# Patient Record
Sex: FEMALE | Race: ASIAN | NOT HISPANIC OR LATINO | Employment: UNEMPLOYED | URBAN - METROPOLITAN AREA
[De-identification: names, ages, dates, MRNs, and addresses within clinical notes are randomized per-mention and may not be internally consistent; named-entity substitution may affect disease eponyms.]

---

## 2022-07-26 ENCOUNTER — OFFICE VISIT (OUTPATIENT)
Dept: PRIMARY CARE CLINIC | Facility: CLINIC | Age: 22
End: 2022-07-26
Payer: COMMERCIAL

## 2022-07-26 VITALS
OXYGEN SATURATION: 98 % | HEART RATE: 89 BPM | TEMPERATURE: 99 F | BODY MASS INDEX: 17.77 KG/M2 | RESPIRATION RATE: 16 BRPM | SYSTOLIC BLOOD PRESSURE: 90 MMHG | WEIGHT: 96.56 LBS | HEIGHT: 62 IN | DIASTOLIC BLOOD PRESSURE: 56 MMHG

## 2022-07-26 DIAGNOSIS — Z13.6 SCREENING FOR CARDIOVASCULAR CONDITION: ICD-10-CM

## 2022-07-26 DIAGNOSIS — L21.9 SEBORRHEIC DERMATITIS OF SCALP: ICD-10-CM

## 2022-07-26 DIAGNOSIS — Z11.4 ENCOUNTER FOR SCREENING FOR HIV: ICD-10-CM

## 2022-07-26 DIAGNOSIS — Z76.89 ENCOUNTER TO ESTABLISH CARE: Primary | ICD-10-CM

## 2022-07-26 DIAGNOSIS — Z11.59 NEED FOR HEPATITIS C SCREENING TEST: ICD-10-CM

## 2022-07-26 DIAGNOSIS — F41.9 ANXIETY: ICD-10-CM

## 2022-07-26 PROCEDURE — 1159F PR MEDICATION LIST DOCUMENTED IN MEDICAL RECORD: ICD-10-PCS | Mod: CPTII,S$GLB,, | Performed by: STUDENT IN AN ORGANIZED HEALTH CARE EDUCATION/TRAINING PROGRAM

## 2022-07-26 PROCEDURE — 99999 PR PBB SHADOW E&M-NEW PATIENT-LVL IV: CPT | Mod: PBBFAC,,, | Performed by: STUDENT IN AN ORGANIZED HEALTH CARE EDUCATION/TRAINING PROGRAM

## 2022-07-26 PROCEDURE — 3078F DIAST BP <80 MM HG: CPT | Mod: CPTII,S$GLB,, | Performed by: STUDENT IN AN ORGANIZED HEALTH CARE EDUCATION/TRAINING PROGRAM

## 2022-07-26 PROCEDURE — 3078F PR MOST RECENT DIASTOLIC BLOOD PRESSURE < 80 MM HG: ICD-10-PCS | Mod: CPTII,S$GLB,, | Performed by: STUDENT IN AN ORGANIZED HEALTH CARE EDUCATION/TRAINING PROGRAM

## 2022-07-26 PROCEDURE — 99999 PR PBB SHADOW E&M-NEW PATIENT-LVL IV: ICD-10-PCS | Mod: PBBFAC,,, | Performed by: STUDENT IN AN ORGANIZED HEALTH CARE EDUCATION/TRAINING PROGRAM

## 2022-07-26 PROCEDURE — 99204 OFFICE O/P NEW MOD 45 MIN: CPT | Mod: S$GLB,,, | Performed by: STUDENT IN AN ORGANIZED HEALTH CARE EDUCATION/TRAINING PROGRAM

## 2022-07-26 PROCEDURE — 3074F PR MOST RECENT SYSTOLIC BLOOD PRESSURE < 130 MM HG: ICD-10-PCS | Mod: CPTII,S$GLB,, | Performed by: STUDENT IN AN ORGANIZED HEALTH CARE EDUCATION/TRAINING PROGRAM

## 2022-07-26 PROCEDURE — 3074F SYST BP LT 130 MM HG: CPT | Mod: CPTII,S$GLB,, | Performed by: STUDENT IN AN ORGANIZED HEALTH CARE EDUCATION/TRAINING PROGRAM

## 2022-07-26 PROCEDURE — 1160F PR REVIEW ALL MEDS BY PRESCRIBER/CLIN PHARMACIST DOCUMENTED: ICD-10-PCS | Mod: CPTII,S$GLB,, | Performed by: STUDENT IN AN ORGANIZED HEALTH CARE EDUCATION/TRAINING PROGRAM

## 2022-07-26 PROCEDURE — 3008F PR BODY MASS INDEX (BMI) DOCUMENTED: ICD-10-PCS | Mod: CPTII,S$GLB,, | Performed by: STUDENT IN AN ORGANIZED HEALTH CARE EDUCATION/TRAINING PROGRAM

## 2022-07-26 PROCEDURE — 1160F RVW MEDS BY RX/DR IN RCRD: CPT | Mod: CPTII,S$GLB,, | Performed by: STUDENT IN AN ORGANIZED HEALTH CARE EDUCATION/TRAINING PROGRAM

## 2022-07-26 PROCEDURE — 3008F BODY MASS INDEX DOCD: CPT | Mod: CPTII,S$GLB,, | Performed by: STUDENT IN AN ORGANIZED HEALTH CARE EDUCATION/TRAINING PROGRAM

## 2022-07-26 PROCEDURE — 1159F MED LIST DOCD IN RCRD: CPT | Mod: CPTII,S$GLB,, | Performed by: STUDENT IN AN ORGANIZED HEALTH CARE EDUCATION/TRAINING PROGRAM

## 2022-07-26 PROCEDURE — 99204 PR OFFICE/OUTPT VISIT, NEW, LEVL IV, 45-59 MIN: ICD-10-PCS | Mod: S$GLB,,, | Performed by: STUDENT IN AN ORGANIZED HEALTH CARE EDUCATION/TRAINING PROGRAM

## 2022-07-26 RX ORDER — KETOCONAZOLE 20 MG/ML
SHAMPOO, SUSPENSION TOPICAL
Qty: 120 ML | Refills: 0 | Status: SHIPPED | OUTPATIENT
Start: 2022-07-26

## 2022-07-26 NOTE — PATIENT INSTRUCTIONS
Est Care:   - 21yo female presenting today to est care.    Seborrheic Dermatitis of Scalp:   - would advise use of Ketoconazole shampoo 3 nightly per week until s/s improve.  Can use a dandruff shampoo on other nights like Head and Shoulders Dandruff.    BMI <19:   - make sure to get adequate diet and continue to exercise as previous.     Anxiety:   - would advise continued exercise, sleep and good diet.   - if anxiety progresses contact clinic for appt and we can discuss counseling or meds.

## 2022-07-26 NOTE — PROGRESS NOTES
Subjective:           Patient ID: July Blue is a 22 y.o. female who presents today with a chief complaint of est care.    Chief Complaint:   Establish Care and Annual Exam      History of Present Illness:    21yo female without any serious medical history presenting today to establish care.     Since end of May has been having itchy and irritated scalp.  Has been told that some scarring and dryness has been noted.    Mostly like a dandruff.  No yellow crusting.   No drainage.  No fevers.    States was up a lot in May with finals.  But overall has been getting worse through the summer.     Got a Selsun Blue shampoo, that helped for a day or two but then the issues continued.    States she has been itching through the night and gets dead skin on the sheets in the AM.    No hx of psoriasis or eczema.  No other areas of concern.      Is a student at [employer].  Is currently slightly behind right now, will be starting Sridhar year.  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   With [employer], a leadership camp, a [employer] camp.    At school weight was 110-111, but since getting home has dropped.  Has lost about 15lb over the summer.    Feels like she eats more here at home than at [facility].  Has not been working out at home like she was at [facility].      Review of Systems   Constitutional: Negative for activity change, fatigue, fever and unexpected weight change.   HENT: Negative for hearing loss, rhinorrhea and trouble swallowing.    Eyes: Negative for discharge and visual disturbance.   Respiratory: Negative for chest tightness and wheezing.    Cardiovascular: Negative for chest pain and palpitations.   Gastrointestinal: Negative for blood in stool, constipation, diarrhea and vomiting.   Endocrine: Negative for polydipsia and polyuria.   Genitourinary: Negative for difficulty urinating, dysuria, hematuria and menstrual problem.   Musculoskeletal: Negative for arthralgias, joint swelling and neck pain.  "  Neurological: Negative for weakness and headaches.   Psychiatric/Behavioral: Negative for confusion and dysphoric mood. The patient is nervous/anxious.            Objective:        Vitals:    07/26/22 0930   BP: (!) 90/56   BP Location: Right arm   Patient Position: Sitting   BP Method: Medium (Manual)   Pulse: 89   Resp: 16   Temp: 98.5 °F (36.9 °C)   TempSrc: Oral   SpO2: 98%   Weight: 43.8 kg (96 lb 9 oz)   Height: 5' 2.21" (1.58 m)       Body mass index is 17.55 kg/m².      Physical Exam  Constitutional:       General: She is not in acute distress.     Appearance: Normal appearance.      Comments: Thin as per BMI.     HENT:      Head: Normocephalic.      Right Ear: External ear normal.      Left Ear: External ear normal.      Mouth/Throat:      Mouth: Mucous membranes are moist.   Eyes:      Extraocular Movements: Extraocular movements intact.      Conjunctiva/sclera: Conjunctivae normal.   Cardiovascular:      Rate and Rhythm: Normal rate and regular rhythm.      Heart sounds: No murmur heard.    No gallop.   Pulmonary:      Effort: Pulmonary effort is normal. No respiratory distress.      Breath sounds: Normal breath sounds.   Abdominal:      General: Abdomen is flat. Bowel sounds are normal. There is no distension.      Palpations: Abdomen is soft.   Musculoskeletal:         General: No swelling or deformity.   Skin:     General: Skin is warm.      Capillary Refill: Capillary refill takes less than 2 seconds.      Coloration: Skin is not jaundiced.      Findings: Rash present.      Comments: Dry flaking skin at temples and nape of neck.   Some few small scabs from itching.      Neurological:      General: No focal deficit present.      Mental Status: She is alert and oriented to person, place, and time.      Motor: No weakness.   Psychiatric:         Mood and Affect: Mood normal.             No results found for: NA, K, CL, CO2, BUN, CREATININE, GLUCOSE, ANIONGAP  No results found for: HGBA1C  No results " found for: BNP, BNPTRIAGEBLO    No results found for: WBC, HGB, HCT, PLT, GRAN  No results found for: CHOL, HDL, LDLCALC, TRIG       Current Outpatient Medications:     ketoconazole (NIZORAL) 2 % shampoo, Take 3 times a week, use Head/Shoulder Dandruff shampoo on other days., Disp: 120 mL, Rfl: 0     Outpatient Encounter Medications as of 7/26/2022   Medication Sig Dispense Refill    ketoconazole (NIZORAL) 2 % shampoo Take 3 times a week, use Head/Shoulder Dandruff shampoo on other days. 120 mL 0     No facility-administered encounter medications on file as of 7/26/2022.          Assessment:       1. Encounter to establish care    2. Seborrheic dermatitis of scalp    3. Adult BMI <19 kg/sq m    4. Anxiety    5. Screening for cardiovascular condition    6. Need for hepatitis C screening test    7. Encounter for screening for HIV           Plan:       Encounter to establish care    Seborrheic dermatitis of scalp  -     CBC Auto Differential; Future; Expected date: 07/26/2022  -     Comprehensive Metabolic Panel; Future; Expected date: 07/26/2022  -     ketoconazole (NIZORAL) 2 % shampoo; Take 3 times a week, use Head/Shoulder Dandruff shampoo on other days.  Dispense: 120 mL; Refill: 0    Adult BMI <19 kg/sq m  -     CBC Auto Differential; Future; Expected date: 07/26/2022  -     Comprehensive Metabolic Panel; Future; Expected date: 07/26/2022  -     Lipid Panel; Future; Expected date: 07/26/2022  -     TSH; Future; Expected date: 07/26/2022  -     T4, Free; Future; Expected date: 07/26/2022    Anxiety  -     TSH; Future; Expected date: 07/26/2022  -     T4, Free; Future; Expected date: 07/26/2022    Screening for cardiovascular condition  -     CBC Auto Differential; Future; Expected date: 07/26/2022  -     Comprehensive Metabolic Panel; Future; Expected date: 07/26/2022  -     Lipid Panel; Future; Expected date: 07/26/2022  -     TSH; Future; Expected date: 07/26/2022  -     T4, Free; Future; Expected date:  07/26/2022    Need for hepatitis C screening test  -     Hepatitis C Antibody; Future; Expected date: 07/26/2022    Encounter for screening for HIV  -     HIV 1/2 Ag/Ab (4th Gen); Future; Expected date: 07/26/2022         Est Care:   - 21yo female presenting today to est care.    Seborrheic Dermatitis of Scalp:   - would advise use of Ketoconazole shampoo 3 nightly per week until s/s improve.  Can use a dandruff shampoo on other nights like Head and Shoulders Dandruff.    BMI <19:   - make sure to get adequate diet and continue to exercise as previous.     Anxiety:   - would advise continued exercise, sleep and good diet.   - if anxiety progresses contact clinic for appt and we can discuss counseling or meds.

## 2024-02-14 ENCOUNTER — PATIENT MESSAGE (OUTPATIENT)
Dept: ADMINISTRATIVE | Facility: HOSPITAL | Age: 24
End: 2024-02-14
Payer: COMMERCIAL

## 2024-02-14 ENCOUNTER — PATIENT OUTREACH (OUTPATIENT)
Dept: ADMINISTRATIVE | Facility: HOSPITAL | Age: 24
End: 2024-02-14
Payer: COMMERCIAL

## 2024-02-14 NOTE — PROGRESS NOTES
Health Maintenance Due   Topic Date Due    Pap Smear  Never done    Influenza Vaccine (1) 2023    COVID-19 Vaccine ( season) 2023     Population Health Chart Review & Patient Outreach Details      Further Action Needed If Patient Returns Outreach:      [facility] panel list reviewed. Patient due for annual wellness visit with PCP. Patient last seen 2022 to establish care. Portal message sent in regards to scheduling.       Updates Requested / Reviewed:     [x]  Care Everywhere    []     [x]  External Sources ([facility], [facility], [facility], etc.)    [x] [facility]   [x] [facility]   [] Other:    [x]  Care Team Updated   []  Removed  or Duplicate Orders   [x]  Immunization Reconciliation Completed / Queried    [x] [place]   [] [place]   [] [place]   [] [place]      Health Maintenance Topics Addressed and Outreach Outcomes / Actions Taken:             Breast Cancer Screening []  Mammogram Order Placed    []  Mammogram Screening Scheduled    []  External Records Requested & Care Team Updated if Applicable    []  External Records Uploaded & Care Team Updated if Applicable    []  Pt Declined Scheduling Mammogram    []  Pt Will Schedule with External Provider / Order Routed & Care Team Updated if Applicable              Cervical Cancer Screening []  Pap Smear Scheduled in Primary Care or OBGYN    []  External Records Requested & Care Team Updated if Applicable       []  External Records Uploaded, Care Team Updated, & History Updated if Applicable    []  Patient Declined Scheduling Pap Smear    []  Patient Will Schedule with External Provider & Care Team Updated if Applicable                  Colorectal Cancer Screening []  Colonoscopy Case Request / Referral / Home Test Order Placed    []  External Records Requested & Care Team Updated if Applicable    []  External Records Uploaded, Care Team Updated, & History Updated if Applicable    []  Patient Declined Completing Colon Cancer Screening    []   Patient Will Schedule with External Provider & Care Team Updated if Applicable    []  Fit Kit Mailed (add the SmartPhrase under additional notes)    []  Reminded Patient to Complete Home Test                Diabetic Eye Exam []  Eye Exam Screening Order Placed    []  Eye Camera Scheduled or Optometry/Ophthalmology Referral Placed    []  External Records Requested & Care Team Updated if Applicable    []  External Records Uploaded, Care Team Updated, & History Updated if Applicable    []  Patient Declined Scheduling Eye Exam    []  Patient Will Schedule with External Provider & Care Team Updated if Applicable             Blood Pressure Control []  Primary Care Follow Up Visit Scheduled     []  Remote Blood Pressure Reading Captured    []  Patient Declined Remote Reading or Scheduling Appt - Escalated to PCP    []  Patient Will Call Back or Send Portal Message with Reading                 HbA1c & Other Labs []  Overdue Lab(s) Ordered    []  Overdue Lab(s) Scheduled    []  External Records Uploaded & Care Team Updated if Applicable    []  Primary Care Follow Up Visit Scheduled     []  Reminded Patient to Complete A1c Home Test    []  Patient Declined Scheduling Labs or Will Call Back to Schedule    []  Patient Will Schedule with External Provider / Order Routed, & Care Team Updated if Applicable           Primary Care Appointment []  Primary Care Appt Scheduled    []  Patient Declined Scheduling or Will Call Back to Schedule    []  Pt Established with External Provider, Updated Care Team, & Informed Pt to Notify Payor if Applicable           Medication Adherence /    Statin Use []  Primary Care Appointment Scheduled    []  Patient Reminded to  Prescription    []  Patient Declined, Provider Notified if Needed    []  Sent Provider Message to Review to Evaluate Pt for Statin, Add Exclusion Dx Codes, Document   Exclusion in Problem List, Change Statin Intensity Level to Moderate or High Intensity if Applicable                 Osteoporosis Screening []  Dexa Order Placed    []  Dexa Appointment Scheduled    []  External Records Requested & Care Team Updated    []  External Records Uploaded, Care Team Updated, & History Updated if Applicable    []  Patient Declined Scheduling Dexa or Will Call Back to Schedule    []  Patient Will Schedule with External Provider / Order Routed & Care Team Updated if Applicable       Additional Notes:

## 2024-09-19 ENCOUNTER — PATIENT MESSAGE (OUTPATIENT)
Dept: PRIMARY CARE CLINIC | Facility: CLINIC | Age: 24
End: 2024-09-19
Payer: COMMERCIAL

## 2025-04-23 ENCOUNTER — OFFICE VISIT (OUTPATIENT)
Dept: PRIMARY CARE CLINIC | Facility: CLINIC | Age: 25
End: 2025-04-23
Payer: COMMERCIAL

## 2025-04-23 VITALS
DIASTOLIC BLOOD PRESSURE: 60 MMHG | SYSTOLIC BLOOD PRESSURE: 92 MMHG | WEIGHT: 96.13 LBS | HEART RATE: 64 BPM | OXYGEN SATURATION: 99 % | HEIGHT: 62 IN | BODY MASS INDEX: 17.69 KG/M2

## 2025-04-23 DIAGNOSIS — Z00.00 ANNUAL PHYSICAL EXAM: Primary | ICD-10-CM

## 2025-04-23 DIAGNOSIS — Z79.899 OTHER LONG TERM (CURRENT) DRUG THERAPY: ICD-10-CM

## 2025-04-23 DIAGNOSIS — Z00.00 HEALTH CARE MAINTENANCE: ICD-10-CM

## 2025-04-23 PROCEDURE — 1159F MED LIST DOCD IN RCRD: CPT | Mod: CPTII,S$GLB,, | Performed by: STUDENT IN AN ORGANIZED HEALTH CARE EDUCATION/TRAINING PROGRAM

## 2025-04-23 PROCEDURE — 3008F BODY MASS INDEX DOCD: CPT | Mod: CPTII,S$GLB,, | Performed by: STUDENT IN AN ORGANIZED HEALTH CARE EDUCATION/TRAINING PROGRAM

## 2025-04-23 PROCEDURE — 99999 PR PBB SHADOW E&M-EST. PATIENT-LVL IV: CPT | Mod: PBBFAC,,, | Performed by: STUDENT IN AN ORGANIZED HEALTH CARE EDUCATION/TRAINING PROGRAM

## 2025-04-23 PROCEDURE — 3078F DIAST BP <80 MM HG: CPT | Mod: CPTII,S$GLB,, | Performed by: STUDENT IN AN ORGANIZED HEALTH CARE EDUCATION/TRAINING PROGRAM

## 2025-04-23 PROCEDURE — 3074F SYST BP LT 130 MM HG: CPT | Mod: CPTII,S$GLB,, | Performed by: STUDENT IN AN ORGANIZED HEALTH CARE EDUCATION/TRAINING PROGRAM

## 2025-04-23 PROCEDURE — 99395 PREV VISIT EST AGE 18-39: CPT | Mod: S$GLB,,, | Performed by: STUDENT IN AN ORGANIZED HEALTH CARE EDUCATION/TRAINING PROGRAM

## 2025-04-23 PROCEDURE — 1160F RVW MEDS BY RX/DR IN RCRD: CPT | Mod: CPTII,S$GLB,, | Performed by: STUDENT IN AN ORGANIZED HEALTH CARE EDUCATION/TRAINING PROGRAM

## 2025-04-23 NOTE — PROGRESS NOTES
Assessment:       1. Annual physical exam    2. Adult BMI <19 kg/sq m    3. Other long term (current) drug therapy    4. Health care maintenance           Plan:     Assessment & Plan    Z00.00 Annual physical exam  Z68.1 Adult BMI <19 kg/sq m  Z79.899 Other long term (current) drug therapy    PLAN SUMMARY:  - Ordered CBC, kidney and liver function tests, thyroid function test, and A1c test at [facility]  - Referred to OB/GYN for well-woman exam and pelvic exam  - Follow-up in 1 year for annual visit    Z00.00 ANNUAL PHYSICAL EXAM:  - Low BP (90s/60s) consistent with previous visit and asymptomatic.  - Previous lab work from 2022 showed normal results for anemia, kidney and liver function, cholesterol, and thyroid.  - Annual labs unnecessary for healthy individuals in their 20s, recommended every 2-3 years if not covered by insurance.  - Recommend baseline HbA1c as diabetes screen, with future screenings every 3 years after age 35.  - Excellent cholesterol levels from previous lipid panel.  - Ordered CBC, kidney and liver function tests, thyroid function test, and A1c test at [facility].  - Referred to OB/GYN for well-woman exam and pelvic exam.  - Follow up in 1 year for annual visit.    Z68.1 ADULT BMI <19 KG/SQ M:  - Smaller stature considered in recommending thyroid function check.             Annual physical exam  -     Cancel: CBC Auto Differential; Future; Expected date: 04/23/2025  -     Cancel: Comprehensive Metabolic Panel; Future; Expected date: 04/23/2025  -     Cancel: Hemoglobin A1C; Future; Expected date: 04/23/2025  -     Cancel: TSH; Future; Expected date: 04/23/2025  -     Ambulatory referral/consult to Obstetrics / Gynecology; Future; Expected date: 04/30/2025  -     CBC Auto Differential; Future; Expected date: 04/23/2025  -     Comprehensive Metabolic Panel; Future; Expected date: 04/23/2025  -     Hemoglobin A1C; Future; Expected date: 04/23/2025  -     TSH; Future; Expected date: 04/23/2025    Adult  BMI <19 kg/sq m  -     Cancel: CBC Auto Differential; Future; Expected date: 04/23/2025  -     Cancel: Comprehensive Metabolic Panel; Future; Expected date: 04/23/2025  -     Cancel: Hemoglobin A1C; Future; Expected date: 04/23/2025  -     Cancel: TSH; Future; Expected date: 04/23/2025  -     CBC Auto Differential; Future; Expected date: 04/23/2025  -     Comprehensive Metabolic Panel; Future; Expected date: 04/23/2025  -     Hemoglobin A1C; Future; Expected date: 04/23/2025  -     TSH; Future; Expected date: 04/23/2025    Other long term (current) drug therapy  -     Cancel: CBC Auto Differential; Future; Expected date: 04/23/2025  -     Cancel: Comprehensive Metabolic Panel; Future; Expected date: 04/23/2025  -     Cancel: Hemoglobin A1C; Future; Expected date: 04/23/2025  -     Cancel: TSH; Future; Expected date: 04/23/2025  -     CBC Auto Differential; Future; Expected date: 04/23/2025  -     Comprehensive Metabolic Panel; Future; Expected date: 04/23/2025  -     Hemoglobin A1C; Future; Expected date: 04/23/2025  -     TSH; Future; Expected date: 04/23/2025    Health care maintenance  -     Ambulatory referral/consult to Obstetrics / Gynecology; Future; Expected date: 04/30/2025                This note was generated with the assistance of ambient listening technology. Verbal consent was obtained by the patient and accompanying visitor(s) for the recording of patient appointment to facilitate this note. I attest to having reviewed and edited the generated note for accuracy, though some syntax or spelling errors may persist. Please contact the author of this note for any clarification.      Subjective:           Patient ID: July Blue   Age:  25 y.o.  Sex: female     Chief Complaint:   Annual Exam      History of Present Illness:    July Blue is a 25 y.o. female who presents today with a chief complaint of Annual Exam  .    26yo female presenting for an annual appt.    BP noted to be lower, discussed  pre-syncope. Has some issues infrequently when getting up to quickly from bed.  Does not ever recall passing out.    Was noted to have some scoliosis at [facility] before.  Denies back pains.  No breathing issues.        History of Present Illness    CHIEF COMPLAINT:  July presents today for follow up.    HYPOTENSION:  Her blood pressure typically runs low, in the 90s/60s with occasional positional dizziness when getting out of bed too quickly. She denies any syncopal episodes.    MEDICAL HISTORY:  She has a history of [facility]. During her last visit, she was informed surgery would be required if spine curvature progressed beyond a certain degree. She is not currently receiving follow-up or treatment for this condition.    SURGICAL HISTORY:  Past surgical history includes wisdom teeth removal.    FAMILY HISTORY:  Family history notable for maternal grandfather with cancer of unknown type and a family member with diabetes. No known family history of breast, colon, lung cancer, heart attacks, or strokes.    MEDICATIONS:  She uses Ascuticonazole shampoo as needed. She denies taking any regular oral medications.    ALLERGIES:  No known drug allergies.    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 She denies tobacco use, alcohol consumption, recreational drug use, and is not sexually active.      ROS:  Cardiovascular: -lower extremity edema  Neurological: +dizziness           Review of Systems   Constitutional:  Negative for activity change and unexpected weight change.   HENT:  Negative for hearing loss, rhinorrhea and trouble swallowing.    Eyes:  Negative for discharge and visual disturbance.   Respiratory:  Negative for chest tightness and wheezing.    Cardiovascular:  Negative for chest pain and palpitations.   Gastrointestinal:  Negative for blood in stool, constipation, diarrhea and vomiting.   Endocrine: Negative for polydipsia and  "polyuria.   Genitourinary:  Negative for difficulty urinating, dysuria, hematuria and menstrual problem.   Musculoskeletal:  Negative for arthralgias, joint swelling and neck pain.   Neurological:  Negative for weakness and headaches.   Psychiatric/Behavioral:  Negative for confusion and dysphoric mood.            Objective:        Vitals:    04/23/25 0803   BP: 92/60   BP Location: Right arm   Patient Position: Sitting   Pulse: 64   SpO2: 99%   Weight: 43.6 kg (96 lb 1.9 oz)   Height: 5' 2" (1.575 m)       Body mass index is 17.58 kg/m².      Physical Exam  Constitutional:       General: She is not in acute distress.     Appearance: Normal appearance.   HENT:      Head: Normocephalic and atraumatic.      Right Ear: External ear normal.      Left Ear: External ear normal.      Nose: No rhinorrhea.      Mouth/Throat:      Mouth: Mucous membranes are moist.   Eyes:      Extraocular Movements: Extraocular movements intact.      Conjunctiva/sclera: Conjunctivae normal.   Cardiovascular:      Rate and Rhythm: Normal rate and regular rhythm.      Pulses: Normal pulses.      Heart sounds: No murmur heard.  Pulmonary:      Effort: Pulmonary effort is normal. No respiratory distress.   Abdominal:      General: Abdomen is flat.   Musculoskeletal:      Right lower leg: No edema.      Left lower leg: No edema.   Skin:     Capillary Refill: Capillary refill takes less than 2 seconds.      Coloration: Skin is not jaundiced.      Findings: No bruising.   Neurological:      General: No focal deficit present.      Mental Status: She is alert and oriented to person, place, and time.      Motor: No weakness.      Gait: Gait normal.   Psychiatric:         Mood and Affect: Mood normal.         Physical Exam    Vitals: Blood pressure: 90s/60s.  Cardiovascular: All normal.               Past Medical History[1]    Lab Results   Component Value Date     07/26/2022    K 4.2 07/26/2022     07/26/2022    CO2 21 (L) 07/26/2022    " "BUN 12 07/26/2022    CREATININE 0.8 07/26/2022    ANIONGAP 8 07/26/2022     No results found for: "HGBA1C"  No results found for: "BNP", "BNPTRIAGEBLO"    Lab Results   Component Value Date    WBC 7.07 07/26/2022    HGB 13.4 07/26/2022    HCT 40.0 07/26/2022     07/26/2022    GRAN 4.2 07/26/2022    GRAN 59.4 07/26/2022     Lab Results   Component Value Date    CHOL 149 07/26/2022    HDL 41 07/26/2022    LDLCALC 89.6 07/26/2022    TRIG 92 07/26/2022        Encounter Medications[2]              [1] No past medical history on file.  [2]   Outpatient Encounter Medications as of 4/23/2025   Medication Sig Dispense Refill    ketoconazole (NIZORAL) 2 % shampoo Take 3 times a week, use Head/Shoulder Dandruff shampoo on other days. 120 mL 0     No facility-administered encounter medications on file as of 4/23/2025.     "

## 2025-04-23 NOTE — PATIENT INSTRUCTIONS
Assessment & Plan    Z00.00 Annual physical exam  Z68.1 Adult BMI <19 kg/sq m  Z79.899 Other long term (current) drug therapy    PLAN SUMMARY:  - Ordered CBC, kidney and liver function tests, thyroid function test, and A1c test at [facility]  - Referred to OB/GYN for well-woman exam and pelvic exam  - Follow-up in 1 year for annual visit    Z00.00 ANNUAL PHYSICAL EXAM:  - Low BP (90s/60s) consistent with previous visit and asymptomatic.  - Previous lab work from 2022 showed normal results for anemia, kidney and liver function, cholesterol, and thyroid.  - Annual labs unnecessary for healthy individuals in their 20s, recommended every 2-3 years if not covered by insurance.  - Recommend baseline HbA1c as diabetes screen, with future screenings every 3 years after age 35.  - Excellent cholesterol levels from previous lipid panel.  - Ordered CBC, kidney and liver function tests, thyroid function test, and A1c test at [facility].  - Referred to OB/GYN for well-woman exam and pelvic exam.  - Follow up in 1 year for annual visit.    Z68.1 ADULT BMI <19 KG/SQ M:  - Smaller stature considered in recommending thyroid function check.

## 2025-04-24 LAB
ALBUMIN SERPL-MCNC: 4.6 G/DL (ref 3.6–5.1)
ALBUMIN/GLOB SERPL: 1.6 (CALC) (ref 1–2.5)
ALP SERPL-CCNC: 44 U/L (ref 31–125)
ALT SERPL-CCNC: 7 U/L (ref 6–29)
AST SERPL-CCNC: 15 U/L (ref 10–30)
BASOPHILS # BLD AUTO: 51 CELLS/UL (ref 0–200)
BASOPHILS NFR BLD AUTO: 0.9 %
BILIRUB SERPL-MCNC: 0.6 MG/DL (ref 0.2–1.2)
BUN SERPL-MCNC: 17 MG/DL (ref 7–25)
BUN/CREAT SERPL: NORMAL (CALC) (ref 6–22)
CALCIUM SERPL-MCNC: 9.4 MG/DL (ref 8.6–10.2)
CHLORIDE SERPL-SCNC: 106 MMOL/L (ref 98–110)
CO2 SERPL-SCNC: 25 MMOL/L (ref 20–32)
CREAT SERPL-MCNC: 0.76 MG/DL (ref 0.5–0.96)
EGFR: 113 ML/MIN/1.73M2
EOSINOPHIL # BLD AUTO: 325 CELLS/UL (ref 15–500)
EOSINOPHIL NFR BLD AUTO: 5.7 %
ERYTHROCYTE [DISTWIDTH] IN BLOOD BY AUTOMATED COUNT: 12.2 % (ref 11–15)
GLOBULIN SER CALC-MCNC: 2.8 G/DL (CALC) (ref 1.9–3.7)
GLUCOSE SERPL-MCNC: 84 MG/DL (ref 65–99)
HBA1C MFR BLD: 5 %
HCT VFR BLD AUTO: 39.4 % (ref 35–45)
HGB BLD-MCNC: 12.8 G/DL (ref 11.7–15.5)
LYMPHOCYTES # BLD AUTO: 2138 CELLS/UL (ref 850–3900)
LYMPHOCYTES NFR BLD AUTO: 37.5 %
MCH RBC QN AUTO: 28.8 PG (ref 27–33)
MCHC RBC AUTO-ENTMCNC: 32.5 G/DL (ref 32–36)
MCV RBC AUTO: 88.7 FL (ref 80–100)
MONOCYTES # BLD AUTO: 314 CELLS/UL (ref 200–950)
MONOCYTES NFR BLD AUTO: 5.5 %
NEUTROPHILS # BLD AUTO: 2873 CELLS/UL (ref 1500–7800)
NEUTROPHILS NFR BLD AUTO: 50.4 %
PLATELET # BLD AUTO: 234 THOUSAND/UL (ref 140–400)
PMV BLD REES-ECKER: 10.6 FL (ref 7.5–12.5)
POTASSIUM SERPL-SCNC: 4.3 MMOL/L (ref 3.5–5.3)
PROT SERPL-MCNC: 7.4 G/DL (ref 6.1–8.1)
RBC # BLD AUTO: 4.44 MILLION/UL (ref 3.8–5.1)
SODIUM SERPL-SCNC: 139 MMOL/L (ref 135–146)
TSH SERPL-ACNC: 0.75 MIU/L
WBC # BLD AUTO: 5.7 THOUSAND/UL (ref 3.8–10.8)

## 2025-04-27 ENCOUNTER — RESULTS FOLLOW-UP (OUTPATIENT)
Dept: PRIMARY CARE CLINIC | Facility: CLINIC | Age: 25
End: 2025-04-27

## 2025-09-04 ENCOUNTER — OFFICE VISIT (OUTPATIENT)
Dept: OBSTETRICS AND GYNECOLOGY | Facility: CLINIC | Age: 25
End: 2025-09-04
Payer: COMMERCIAL

## 2025-09-04 VITALS — SYSTOLIC BLOOD PRESSURE: 100 MMHG | WEIGHT: 95 LBS | DIASTOLIC BLOOD PRESSURE: 58 MMHG | BODY MASS INDEX: 17.38 KG/M2

## 2025-09-04 DIAGNOSIS — Z12.4 CERVICAL CANCER SCREENING: Primary | ICD-10-CM

## 2025-09-04 PROCEDURE — 3078F DIAST BP <80 MM HG: CPT | Mod: CPTII,S$GLB,, | Performed by: STUDENT IN AN ORGANIZED HEALTH CARE EDUCATION/TRAINING PROGRAM

## 2025-09-04 PROCEDURE — 3008F BODY MASS INDEX DOCD: CPT | Mod: CPTII,S$GLB,, | Performed by: STUDENT IN AN ORGANIZED HEALTH CARE EDUCATION/TRAINING PROGRAM

## 2025-09-04 PROCEDURE — 99999 PR PBB SHADOW E&M-EST. PATIENT-LVL III: CPT | Mod: PBBFAC,,, | Performed by: STUDENT IN AN ORGANIZED HEALTH CARE EDUCATION/TRAINING PROGRAM

## 2025-09-04 PROCEDURE — 1160F RVW MEDS BY RX/DR IN RCRD: CPT | Mod: CPTII,S$GLB,, | Performed by: STUDENT IN AN ORGANIZED HEALTH CARE EDUCATION/TRAINING PROGRAM

## 2025-09-04 PROCEDURE — 3074F SYST BP LT 130 MM HG: CPT | Mod: CPTII,S$GLB,, | Performed by: STUDENT IN AN ORGANIZED HEALTH CARE EDUCATION/TRAINING PROGRAM

## 2025-09-04 PROCEDURE — 1159F MED LIST DOCD IN RCRD: CPT | Mod: CPTII,S$GLB,, | Performed by: STUDENT IN AN ORGANIZED HEALTH CARE EDUCATION/TRAINING PROGRAM

## 2025-09-04 PROCEDURE — 3044F HG A1C LEVEL LT 7.0%: CPT | Mod: CPTII,S$GLB,, | Performed by: STUDENT IN AN ORGANIZED HEALTH CARE EDUCATION/TRAINING PROGRAM

## 2025-09-04 PROCEDURE — 99385 PREV VISIT NEW AGE 18-39: CPT | Mod: S$GLB,,, | Performed by: STUDENT IN AN ORGANIZED HEALTH CARE EDUCATION/TRAINING PROGRAM
